# Patient Record
(demographics unavailable — no encounter records)

---

## 2025-02-10 NOTE — HISTORY OF PRESENT ILLNESS
[IUD] : has an intrauterine device [Y] : Positive pregnancy history [Regular Cycle Intervals] : periods have been regular [Frequency: Q ___ days] : menstrual periods occur approximately every [unfilled] days [Menarche Age: ____] : age at menarche was [unfilled] [FreeTextEntry1] : 30 y.o  (LMP 10/23/2023) presenting for GYN annual.  Overall doing well.  Initially had 2 to 3 months of irregular bleeding after IUD removal and reinsertion.  Now periods are regular and denies any intermenstrual bleeding or spotting.  Sexually active with male partner.  States that partner would like to try for another pregnancy-is currently considering IUD removal in the summer. Otherwise no additional concerns  HCM: - pap (): WNL per patient [PGxTotal] : 1 [[facility]] : 1 [PGHxPremature] : 0 [PGHxAbortions] : 0 [[facility] : [facility] [PGHxABInduced] : 0 [PGHxABSpont] : 0 [PGHxEctopic] : 0 [PGHxMultBirths] : 0

## 2025-02-10 NOTE — PHYSICAL EXAM
[Chaperone Present] : A chaperone was present in the examining room during all aspects of the physical examination [70201] : A chaperone was present during the pelvic exam. [Appropriately responsive] : appropriately responsive [Alert] : alert [No Acute Distress] : no acute distress [Soft] : soft [Non-tender] : non-tender [No Lesions] : no lesions [No Mass] : no mass [Oriented x3] : oriented x3 [Examination Of The Breasts] : a normal appearance [No Masses] : no breast masses were palpable [Labia Majora] : normal [Labia Minora] : normal [IUD String] : an IUD string was noted [Normal] : normal [Uterine Adnexae] : normal

## 2025-02-10 NOTE — DISCUSSION/SUMMARY
[FreeTextEntry1] : 30 y.o  (LMP 10/23/2023) presenting for GYN annual.  PCP: Referral placed    #Well Woman Visit  1. Nutrition/Activity: The benefits of physical activity and balanced diet.  2. Health Screening: She was informed of the benefits of a screening Pap. - Cervix: We reviewed ASCCP/ACOG guidelines for pap smear screening. PAP collected today. 3. Sex Health: The importance of safe-sex practices was discussed with the patient. STD screening was offered to patient, she accepts GC testing 4. Contraception: Mirena IUD strings visualized.  Discussed condom use for STD prevention 5. Denies any hx of DM/HTN 6.  Has received Gardasil vaccination series       She verbalized understanding and agreement with above counseling regarding differential diagnosis, evaluation, and plan. She was given time for questions/concerns which were all answered to her apparent satisfaction.    RTO in 1 yr for annual

## 2025-07-23 NOTE — REVIEW OF SYSTEMS
[Constipation] : constipation [Fever] : no fever [Chills] : no chills [Pain] : no pain [Redness] : no redness [Vision Problems] : no vision problems [Hearing Loss] : no hearing loss [Chest Pain] : no chest pain [Palpitations] : no palpitations [Shortness Of Breath] : no shortness of breath [Cough] : no cough [Nausea] : no nausea [Diarrhea] : diarrhea [Vomiting] : no vomiting [Heartburn] : no heartburn [Headache] : no headache [Dizziness] : no dizziness [Depression] : no depression [Easy Bleeding] : no easy bleeding

## 2025-07-23 NOTE — ASSESSMENT
[Vaccines Reviewed] : Immunizations reviewed today. Please see immunization details in the vaccine log within the immunization flowsheet.  [FreeTextEntry1] : A/P #Hypothyroidism -Constipated, Bradycardic, Fatigued, Hard to lose weight  -TSH, will f/u  #B/L LE Swelling -B/l LE venous Dopplers -counselled on wearing compression socks and walking during flights  #HCM -UTD w/ TDap -UTD w/ gyn visits -CBC, CMP, UA, Lipid panel, TSH

## 2025-07-23 NOTE — HEALTH RISK ASSESSMENT
[Good] : ~his/her~  mood as  good [Yes] : Yes [2 - 4 times a month (2 pts)] : 2-4 times a month (2 points) [No] : In the past 12 months have you used drugs other than those required for medical reasons? No [0] : 2) Feeling down, depressed, or hopeless: Not at all (0) [Never] : Never [Patient reported PAP Smear was normal] : Patient reported PAP Smear was normal [Employed] : employed [] :  [# Of Children ___] : has [unfilled] children [Smoke Detector] : smoke detector [Carbon Monoxide Detector] : carbon monoxide detector [Seat Belt] :  uses seat belt [Sunscreen] : uses sunscreen [With Significant Other] : lives with significant other [With Family] : lives with family [Sexually Active] : sexually active [Feels Safe at Home] : Feels safe at home [de-identified] : Daily walking [de-identified] : Try to be healthy [Reports changes in hearing] : Reports no changes in hearing [Reports changes in vision] : Reports no changes in vision [Reports changes in dental health] : Reports no changes in dental health [PapSmearDate] : 10/24 [FreeTextEntry2] : [facility] [FreeTextEntry3] : 1

## 2025-07-23 NOTE — HISTORY OF PRESENT ILLNESS
[FreeTextEntry1] : CPE [de-identified] : 30yo F no PMHx, presenting for annual CPE.   She flew to [facility] recently and experienced b/l LE swelling R>L. She was given a water pill after which the swelling went away but she experienced it again on the flight back. Has a mirena IUD. Endorses FHx of mother having recent unprovoked DVT.    She also stated that she's been increasingly fatigued for the past month and it has been hard for her to lose weight. She has been struggling with constipation for a while, stating that she has a BM 1x week.

## 2025-07-23 NOTE — HEALTH RISK ASSESSMENT
[Good] : ~his/her~  mood as  good [Yes] : Yes [2 - 4 times a month (2 pts)] : 2-4 times a month (2 points) [No] : In the past 12 months have you used drugs other than those required for medical reasons? No [0] : 2) Feeling down, depressed, or hopeless: Not at all (0) [Never] : Never [Patient reported PAP Smear was normal] : Patient reported PAP Smear was normal [Employed] : employed [] :  [# Of Children ___] : has [unfilled] children [Smoke Detector] : smoke detector [Carbon Monoxide Detector] : carbon monoxide detector [Seat Belt] :  uses seat belt [Sunscreen] : uses sunscreen [With Significant Other] : lives with significant other [With Family] : lives with family [Sexually Active] : sexually active [Feels Safe at Home] : Feels safe at home [de-identified] : Daily walking [de-identified] : Try to be healthy [Reports changes in hearing] : Reports no changes in hearing [Reports changes in vision] : Reports no changes in vision [Reports changes in dental health] : Reports no changes in dental health [PapSmearDate] : 10/24 [FreeTextEntry2] : [facility] [FreeTextEntry3] : 1

## 2025-07-23 NOTE — HISTORY OF PRESENT ILLNESS
[FreeTextEntry1] : CPE [de-identified] : 32yo F no PMHx, presenting for annual CPE.   She flew to [facility] recently and experienced b/l LE swelling R>L. She was given a water pill after which the swelling went away but she experienced it again on the flight back. Has a mirena IUD. Endorses FHx of mother having recent unprovoked DVT.    She also stated that she's been increasingly fatigued for the past month and it has been hard for her to lose weight. She has been struggling with constipation for a while, stating that she has a BM 1x week.